# Patient Record
Sex: FEMALE | URBAN - METROPOLITAN AREA
[De-identification: names, ages, dates, MRNs, and addresses within clinical notes are randomized per-mention and may not be internally consistent; named-entity substitution may affect disease eponyms.]

---

## 2019-02-20 ENCOUNTER — IMPORTED ENCOUNTER (OUTPATIENT)
Dept: URBAN - METROPOLITAN AREA CLINIC 9 | Facility: CLINIC | Age: 34
End: 2019-02-20

## 2020-04-16 ENCOUNTER — IMPORTED ENCOUNTER (OUTPATIENT)
Dept: URBAN - METROPOLITAN AREA CLINIC 9 | Facility: CLINIC | Age: 35
End: 2020-04-16

## 2020-04-22 ENCOUNTER — IMPORTED ENCOUNTER (OUTPATIENT)
Dept: URBAN - METROPOLITAN AREA CLINIC 9 | Facility: CLINIC | Age: 35
End: 2020-04-22

## 2020-05-04 ENCOUNTER — IMPORTED ENCOUNTER (OUTPATIENT)
Dept: URBAN - METROPOLITAN AREA CLINIC 9 | Facility: CLINIC | Age: 35
End: 2020-05-04

## 2020-07-21 ENCOUNTER — IMPORTED ENCOUNTER (OUTPATIENT)
Dept: URBAN - METROPOLITAN AREA CLINIC 9 | Facility: CLINIC | Age: 35
End: 2020-07-21

## 2020-11-24 NOTE — PATIENT DISCUSSION
Surgery Counseling: I have discussed the option of glasses versus cataract surgery versus following. It was explained that when the patients vision no longer meets their visual needs and a glasses prescription does not improve visual symptoms, the option of cataract surgery is a reasonable next step. It was explained that there is no guarantee that removing the cataract will improve their visual symptoms, however; it is believed that the cataract is contributing to the patient's visual impairment and surgery may improve both the visual and functional status of the patient. The risks, benefits and alternatives of surgery were discussed with the patient. After this discussion, the patient desires to proceed with cataract surgery with implantation of an intraocular lens to improve vision for reading road signs and watching television.

## 2020-12-02 NOTE — PATIENT DISCUSSION
SAME DAY S/P PC IOL, OS  - CONTINUE DROPS AS DIRECTED. PT RELEASED TO  PROVIDER FOR CONTINUED CARE. RTC PRN.

## 2021-09-27 NOTE — PATIENT DISCUSSION
REFRACTIVE ERROR, OD - DISCUSSED OPTION OF CORRECTING AT THE TIME OF CATARACT SURGERY. PT UNDERSTANDS AND ELECTS TO CONSIDER THEIR OPTIONS.

## 2021-10-16 ASSESSMENT — KERATOMETRY
OD_K2POWER_DIOPTERS: 45
OD_AXISANGLE2_DEGREES: 105
OS_AXISANGLE_DEGREES: 165
OS_K2POWER_DIOPTERS: 45
OD_K1POWER_DIOPTERS: 44.5
OS_K1POWER_DIOPTERS: 44.25
OD_K1POWER_DIOPTERS: 44.5
OS_K1POWER_DIOPTERS: 44.25
OS_AXISANGLE_DEGREES: 175
OS_AXISANGLE2_DEGREES: 85
OD_AXISANGLE_DEGREES: 15
OD_AXISANGLE_DEGREES: 24
OD_K2POWER_DIOPTERS: 45.25
OS_AXISANGLE2_DEGREES: 75
OD_AXISANGLE2_DEGREES: 114
OS_K2POWER_DIOPTERS: 45.25

## 2021-10-16 ASSESSMENT — TONOMETRY
OD_IOP_MMHG: 18
OS_IOP_MMHG: 19
OD_IOP_MMHG: 20
OS_IOP_MMHG: 17
OS_IOP_MMHG: 18
OD_IOP_MMHG: 18
OS_IOP_MMHG: 18
OD_IOP_MMHG: 19
OD_IOP_MMHG: 20
OS_IOP_MMHG: 19

## 2021-10-16 ASSESSMENT — VISUAL ACUITY
OD_CC: 20/20 -2 SN
OS_CC: 14.2
OD_CC: 20/20 SN
OS_CC: 20/20 SN
OD_CC: 14.2
OS_CC: 20/20 SN
OD_CC: 20/20 SN

## 2021-10-16 ASSESSMENT — PACHYMETRY
OS_CT_UM: 564.0
OD_CT_UM: 560.0

## 2021-10-20 NOTE — PATIENT DISCUSSION
Doing well. Use eye meds as instructed. Avoid touching/rubbing eye. Call with any worsening vision or pain. All other post op precautions (eg elevation, if applicable) & instructions given. All questions answered.

## 2023-09-19 ENCOUNTER — CONSULTATION/EVALUATION (OUTPATIENT)
Dept: URBAN - METROPOLITAN AREA CLINIC 14 | Facility: CLINIC | Age: 38
End: 2023-09-19

## 2023-09-19 DIAGNOSIS — H52.7: ICD-10-CM

## 2023-09-19 PROCEDURE — 99499LK REFRACTIVE CONSULT/LASIK

## 2023-09-19 ASSESSMENT — KERATOMETRY
OS_AXISANGLE2_DEGREES: 75
OS_AXISANGLE2_DEGREES: 169
OD_AXISANGLE2_DEGREES: 21
OS_K2POWER_DIOPTERS: 45
OD_K1POWER_DIOPTERS: 44.5
OD_K2POWER_DIOPTERS: 44.50
OS_K1POWER_DIOPTERS: 45.00
OS_K1POWER_DIOPTERS: 44.25
OS_AXISANGLE_DEGREES: 79
OD_K1POWER_DIOPTERS: 45.25
OD_AXISANGLE2_DEGREES: 105
OD_AXISANGLE_DEGREES: 15
OS_K2POWER_DIOPTERS: 44.25
OD_K2POWER_DIOPTERS: 45.25
OD_AXISANGLE_DEGREES: 111
OS_AXISANGLE_DEGREES: 165

## 2023-09-19 ASSESSMENT — VISUAL ACUITY
OD_CC: 20/20
OS_SC: 20/400
OD_SC: 20/400
OS_BCVA: 20/20
OD_BCVA: 20/20
OS_CC: 20/20

## 2023-09-19 ASSESSMENT — TONOMETRY
OS_IOP_MMHG: 21
OD_IOP_MMHG: 16

## 2023-09-19 ASSESSMENT — PACHYMETRY
OD_CT_UM: 558
OS_CT_UM: 552

## 2023-09-28 ENCOUNTER — CLINIC PROCEDURE ONLY (OUTPATIENT)
Dept: URBAN - METROPOLITAN AREA CLINIC 14 | Facility: CLINIC | Age: 38
End: 2023-09-28

## 2023-09-28 DIAGNOSIS — H52.7: ICD-10-CM

## 2023-09-28 PROCEDURE — 66999NC LASER SUITE OFFICE PROCEDURE NO CHARGE

## 2023-09-28 ASSESSMENT — KERATOMETRY
OS_AXISANGLE_DEGREES: 79
OS_K1POWER_DIOPTERS: 45.00
OS_K2POWER_DIOPTERS: 45
OD_K2POWER_DIOPTERS: 45.25
OS_AXISANGLE2_DEGREES: 169
OD_AXISANGLE2_DEGREES: 105
OD_AXISANGLE_DEGREES: 111
OS_K2POWER_DIOPTERS: 44.25
OS_AXISANGLE_DEGREES: 165
OS_K1POWER_DIOPTERS: 44.25
OS_AXISANGLE2_DEGREES: 75
OD_K2POWER_DIOPTERS: 44.50
OD_K1POWER_DIOPTERS: 44.5
OD_AXISANGLE_DEGREES: 15
OD_AXISANGLE2_DEGREES: 21
OD_K1POWER_DIOPTERS: 45.25

## 2023-09-29 ENCOUNTER — POST-OP (OUTPATIENT)
Dept: URBAN - METROPOLITAN AREA CLINIC 14 | Facility: CLINIC | Age: 38
End: 2023-09-29

## 2023-09-29 DIAGNOSIS — Z98.890: ICD-10-CM

## 2023-09-29 PROCEDURE — 99024LK POST OP LASIK CARE ONLY

## 2023-09-29 ASSESSMENT — KERATOMETRY
OS_K2POWER_DIOPTERS: 44.25
OD_K2POWER_DIOPTERS: 44.50
OS_AXISANGLE2_DEGREES: 75
OD_K1POWER_DIOPTERS: 45.25
OS_K1POWER_DIOPTERS: 44.25
OD_K2POWER_DIOPTERS: 45.25
OD_AXISANGLE2_DEGREES: 105
OS_AXISANGLE_DEGREES: 165
OS_AXISANGLE_DEGREES: 79
OS_K1POWER_DIOPTERS: 45.00
OD_AXISANGLE2_DEGREES: 21
OD_K1POWER_DIOPTERS: 44.5
OS_AXISANGLE2_DEGREES: 169
OD_AXISANGLE_DEGREES: 15
OS_K2POWER_DIOPTERS: 45
OD_AXISANGLE_DEGREES: 111

## 2023-09-29 ASSESSMENT — VISUAL ACUITY
OS_SC: 20/20
OD_SC: 20/20
OU_SC: 20/20

## 2023-10-06 ENCOUNTER — POST-OP (OUTPATIENT)
Dept: URBAN - METROPOLITAN AREA CLINIC 14 | Facility: CLINIC | Age: 38
End: 2023-10-06

## 2023-10-06 DIAGNOSIS — Z98.890: ICD-10-CM

## 2023-10-06 PROCEDURE — 99024LK POST OP LASIK CARE ONLY

## 2023-10-06 ASSESSMENT — VISUAL ACUITY
OD_SC: 20/20
OU_SC: 20/20
OS_SC: 20/20

## 2023-10-06 ASSESSMENT — KERATOMETRY
OS_K2POWER_DIOPTERS: 45
OS_AXISANGLE2_DEGREES: 169
OD_K1POWER_DIOPTERS: 44.5
OS_AXISANGLE_DEGREES: 79
OS_K2POWER_DIOPTERS: 44.25
OS_K1POWER_DIOPTERS: 44.25
OD_AXISANGLE_DEGREES: 111
OD_K2POWER_DIOPTERS: 45.25
OS_AXISANGLE_DEGREES: 165
OD_AXISANGLE2_DEGREES: 105
OS_AXISANGLE2_DEGREES: 75
OD_AXISANGLE_DEGREES: 15
OD_K2POWER_DIOPTERS: 44.50
OD_AXISANGLE2_DEGREES: 21
OS_K1POWER_DIOPTERS: 45.00
OD_K1POWER_DIOPTERS: 45.25

## 2023-11-06 ENCOUNTER — POST-OP (OUTPATIENT)
Dept: URBAN - METROPOLITAN AREA CLINIC 14 | Facility: CLINIC | Age: 38
End: 2023-11-06

## 2023-11-06 DIAGNOSIS — Z98.890: ICD-10-CM

## 2023-11-06 PROCEDURE — 99024LK POST OP LASIK CARE ONLY

## 2023-11-06 ASSESSMENT — KERATOMETRY
OS_K1POWER_DIOPTERS: 45.00
OS_K2POWER_DIOPTERS: 45
OD_AXISANGLE_DEGREES: 15
OD_AXISANGLE2_DEGREES: 21
OD_K2POWER_DIOPTERS: 44.50
OD_AXISANGLE2_DEGREES: 105
OS_K1POWER_DIOPTERS: 44.25
OS_AXISANGLE_DEGREES: 79
OS_AXISANGLE_DEGREES: 165
OD_AXISANGLE_DEGREES: 111
OD_K2POWER_DIOPTERS: 45.25
OS_AXISANGLE2_DEGREES: 75
OD_K1POWER_DIOPTERS: 44.5
OD_K1POWER_DIOPTERS: 45.25
OS_AXISANGLE2_DEGREES: 169
OS_K2POWER_DIOPTERS: 44.25

## 2023-11-06 ASSESSMENT — VISUAL ACUITY
OD_SC: 20/20
OU_SC: 20/20
OS_SC: 20/20

## 2024-01-29 ENCOUNTER — POST-OP (OUTPATIENT)
Dept: URBAN - METROPOLITAN AREA CLINIC 14 | Facility: CLINIC | Age: 39
End: 2024-01-29

## 2024-01-29 DIAGNOSIS — Z98.890: ICD-10-CM

## 2024-01-29 PROCEDURE — 99024 POSTOP FOLLOW-UP VISIT: CPT

## 2024-01-29 ASSESSMENT — KERATOMETRY
OS_K2POWER_DIOPTERS: 45
OD_AXISANGLE2_DEGREES: 105
OD_K1POWER_DIOPTERS: 45.25
OS_K1POWER_DIOPTERS: 45.00
OD_K2POWER_DIOPTERS: 45.25
OS_K1POWER_DIOPTERS: 44.25
OD_AXISANGLE_DEGREES: 111
OD_AXISANGLE2_DEGREES: 21
OD_AXISANGLE_DEGREES: 15
OS_AXISANGLE_DEGREES: 165
OS_AXISANGLE_DEGREES: 79
OD_K1POWER_DIOPTERS: 44.5
OD_K2POWER_DIOPTERS: 44.50
OS_AXISANGLE2_DEGREES: 169
OS_AXISANGLE2_DEGREES: 75
OS_K2POWER_DIOPTERS: 44.25

## 2024-01-29 ASSESSMENT — VISUAL ACUITY
OS_SC: 20/20
OD_SC: 20/20

## 2024-04-15 ENCOUNTER — POST-OP (OUTPATIENT)
Dept: URBAN - METROPOLITAN AREA CLINIC 14 | Facility: CLINIC | Age: 39
End: 2024-04-15

## 2024-04-15 DIAGNOSIS — H40.013: ICD-10-CM

## 2024-04-15 PROCEDURE — 99024 POSTOP FOLLOW-UP VISIT: CPT

## 2024-04-15 ASSESSMENT — KERATOMETRY
OS_AXISANGLE2_DEGREES: 169
OD_AXISANGLE2_DEGREES: 105
OS_K1POWER_DIOPTERS: 44.25
OD_K1POWER_DIOPTERS: 44.5
OS_K1POWER_DIOPTERS: 45.00
OD_K2POWER_DIOPTERS: 45.25
OS_K2POWER_DIOPTERS: 44.25
OD_K1POWER_DIOPTERS: 45.25
OS_AXISANGLE_DEGREES: 79
OD_K2POWER_DIOPTERS: 44.50
OS_AXISANGLE_DEGREES: 165
OD_AXISANGLE2_DEGREES: 21
OS_AXISANGLE2_DEGREES: 75
OS_K2POWER_DIOPTERS: 45
OD_AXISANGLE_DEGREES: 111
OD_AXISANGLE_DEGREES: 15

## 2024-04-15 ASSESSMENT — VISUAL ACUITY
OS_SC: 20/20
OD_SC: 20/20